# Patient Record
(demographics unavailable — no encounter records)

---

## 2025-01-30 NOTE — HISTORY OF PRESENT ILLNESS
[de-identified] : Patient comes in with ear itching bilaterally. She feels that she still gets occasional discomfort in the left ear. She tries not to itch as it makes the pain worse in the ear. She denies issues with headaches, nasal congestion or runny nose  SHe does still have noises in both ears, worse in the left

## 2025-01-30 NOTE — PHYSICAL EXAM
[Midline] : trachea located in midline position [Normal] : no rashes [de-identified] : cerumen in the ear canals; once removed normal EACs

## 2025-01-30 NOTE — END OF VISIT
[FreeTextEntry3] : I, Dr. Larkin personally performed the evaluation and management (E/M) services , including all procedures, for this established patient who presents today with (a) new problem(s)/exacerbation of (an) existing condition(s). That E/M includes conducting the clinically appropriate interval history &/or exam, assessing all new/exacerbated conditions, and establishing a new plan of care. Today, my [facility], Kristi Snyder, was here to observe &/or participate in the visit & follow plan of care established by me.

## 2025-05-30 NOTE — PHYSICAL EXAM
[Midline] : trachea located in midline position [Normal] : no rashes [de-identified] : cerumen in the ear canals; once removed normal EACs

## 2025-05-30 NOTE — HISTORY OF PRESENT ILLNESS
[de-identified] : Patient comes in with ear itching bilaterally. She feels that she still gets occasional discomfort in the left ear. She tries not to itch as it makes the pain worse in the ear. She denies issues with headaches, nasal congestion or runny nose  SHe does still have noises in both ears, worse in the left

## 2025-05-30 NOTE — ASSESSMENT
[FreeTextEntry1] : Patient has sensorineural hearing loss has a hearing aid in the left ear feels that is diminished she has cerumen impaction curetted out she does not want a hearing test this visit she will probably get her on her next visit follow-up as needed.